# Patient Record
Sex: FEMALE | Race: WHITE | NOT HISPANIC OR LATINO | Employment: OTHER | URBAN - METROPOLITAN AREA
[De-identification: names, ages, dates, MRNs, and addresses within clinical notes are randomized per-mention and may not be internally consistent; named-entity substitution may affect disease eponyms.]

---

## 2021-05-05 ENCOUNTER — HISTORICAL (OUTPATIENT)
Dept: ADMINISTRATIVE | Facility: HOSPITAL | Age: 71
End: 2021-05-05

## 2021-06-23 ENCOUNTER — HISTORICAL (OUTPATIENT)
Dept: ADMINISTRATIVE | Facility: HOSPITAL | Age: 71
End: 2021-06-23

## 2022-04-30 NOTE — OP NOTE
Patient:   Yane Graves             MRN: [id number]            FIN: [id number]               Age:   70 years     Sex:  Female     :  1950   Associated Diagnoses:   None   Author:   Sterling Leo MD      Preoperative diagnosis: Cataract of the right  left eye     Postoperative diagnosis: Same     Operative procedure: Cataract extraction with intraocular lens implant    Lens Style: [id number]    The patient was brought to the operating theater by wheelchair and under light sedation given topical anesthesia using 0.75% Marcaine.  After adequate anesthesia the patient was then prepped and draped in usual ophthalmological manner.   Rachna lid speculums were applied and under the surgical microscope a keratome incision was made temporally using a lucia keratome blade and a 15° lucia keratome stab incision was made in the superior nasal quadrant.  Viscoat was instilled into the anterior chamber and an anterior capsulorrhexis was performed using a bent 25-gauge needle and the anterior capsule flap withdrawn with Kelman forceps. Using an irrigating Kelman cystotome the nucleus was irrigated and rocked free from the cortical bed and the handpiece was withdrawn. The phacoemulsification handpiece was introduced into the eye and phacoemulsification carried out the posterior chamber and the iris plane while a nucleus manipulator was used to direct the nucleus fragments  towards the phacoemulsification tip and prevent corneal contact. After phacoemulsification of the nucleus was completed the handpiece was withdrawn and an irrigation-aspiration handpiece was introduced into the eye and all visible cortical fragments were aspirated from the eye without difficulty. The handpiece was withdrawn and Healon was introduced into the eye to inflate the anterior chamber and the capsular bag.  An intraocular lens implant was selected, inspected, folded and placed into the lens injector and then the lens carefully  injected into the capsular bag while the haptics were positioned using the nucleus manipulator. The Healon was then aspirated from the eye using an irrigation-aspiration handpiece and the handpiece withdrawn from the eye. The anterior chamber was inflated with balanced salt solution and the wound checked for water tightness and found to be intact.  The antibiotic drops used preoperatively were instilled into the eye and the patient sent to the outpatient recovery in good condition.

## 2022-04-30 NOTE — OP NOTE
Patient:   Yane Graves             MRN: [id number]            FIN: [id number]               Age:   70 years     Sex:  Female     :  1950   Associated Diagnoses:   None   Author:   Sterling Leo MD      Preoperative diagnosis: Cataract of the  left eye     Postoperative diagnosis: Same     Operative procedure: Cataract extraction with intraocular lens implant    Lens Style: [id number]    The patient was brought to the operating theater by wheelchair and under light sedation given topical anesthesia using 0.75% Marcaine.  After adequate anesthesia the patient was then prepped and draped in usual ophthalmological manner.   Rachna lid speculums were applied and under the surgical microscope a keratome incision was made temporally using a lucia keratome blade and a 15° lucia keratome stab incision was made in the superior nasal quadrant.  Shugarcaine was used to improve pupillary dilation.  Viscoat was instilled into the anterior chamber and an anterior capsulorrhexis was performed using a bent 25-gauge needle and the anterior capsule flap withdrawn with Kelman forceps. Using an irrigating Kelman cystotome the nucleus was irrigated and rocked free from the cortical bed and the handpiece was withdrawn. The phacoemulsification handpiece was introduced into the eye and phacoemulsification carried out the posterior chamber and the iris plane while a nucleus manipulator was used to direct the nucleus fragments  towards the phacoemulsification tip and prevent corneal contact. After phacoemulsification of the nucleus was completed the handpiece was withdrawn and an irrigation-aspiration handpiece was introduced into the eye and all visible cortical fragments were aspirated from the eye without difficulty. The handpiece was withdrawn and Healon was introduced into the eye to inflate the anterior chamber and the capsular bag.  An intraocular lens implant was selected, inspected, folded and placed into  the lens injector and then the lens carefully injected into the capsular bag while the haptics were positioned using the nucleus manipulator. The Healon was then aspirated from the eye using an irrigation-aspiration handpiece and the handpiece withdrawn from the eye. The anterior chamber was inflated with balanced salt solution and the wound checked for water tightness and found to be intact.  The antibiotic drops used preoperatively were instilled into the eye and the patient sent to the outpatient recovery in good condition.

## 2022-04-30 NOTE — OP NOTE
Patient:   Yane Graves             MRN: [id number]            FIN: [id number]               Age:   70 years     Sex:  Female     :  1950   Associated Diagnoses:   None   Author:   Sterling Leo MD      Preoperative diagnosis: Cataract of the right  eye     Postoperative diagnosis: Same     Operative procedure: Cataract extraction with intraocular lens implant    Lens Style: [id number]    The patient was brought to the operating theater by wheelchair and under light sedation given topical anesthesia using 0.75% Marcaine.  After adequate anesthesia the patient was then prepped and draped in usual ophthalmological manner.   Rachna lid speculums were applied and under the surgical microscope a keratome incision was made temporally using a lucia keratome blade and a 15° lucia keratome stab incision was made in the superior nasal quadrant.  Shugarcaine was used to improve pupillary dilation.   Viscoat was instilled into the anterior chamber and an anterior capsulorrhexis was performed using a bent 25-gauge needle and the anterior capsule flap withdrawn with Kelman forceps. Using an irrigating Kelman cystotome the nucleus was irrigated and rocked free from the cortical bed and the handpiece was withdrawn. The phacoemulsification handpiece was introduced into the eye and phacoemulsification carried out the posterior chamber and the iris plane while a nucleus manipulator was used to direct the nucleus fragments  towards the phacoemulsification tip and prevent corneal contact. After phacoemulsification of the nucleus was completed the handpiece was withdrawn and an irrigation-aspiration handpiece was introduced into the eye and all visible cortical fragments were aspirated from the eye without difficulty. The handpiece was withdrawn and Healon was introduced into the eye to inflate the anterior chamber and the capsular bag.  An intraocular lens implant was selected, inspected, folded and placed  into the lens injector and then the lens carefully injected into the capsular bag while the haptics were positioned using the nucleus manipulator. The Healon was then aspirated from the eye using an irrigation-aspiration handpiece and the handpiece withdrawn from the eye. The anterior chamber was inflated with balanced salt solution and the wound checked for water tightness and found to be intact.  The antibiotic drops used preoperatively were instilled into the eye and the patient sent to the outpatient recovery in good condition.

## 2022-05-17 DIAGNOSIS — D15.1 ATRIAL MYXOMA: Primary | ICD-10-CM

## 2022-07-07 DIAGNOSIS — D15.1 BENIGN NEOPLASM OF HEART: Primary | ICD-10-CM

## 2022-07-12 ENCOUNTER — HOSPITAL ENCOUNTER (OUTPATIENT)
Dept: CARDIOLOGY | Facility: HOSPITAL | Age: 72
Discharge: HOME | End: 2022-07-12
Attending: INTERNAL MEDICINE
Payer: COMMERCIAL

## 2022-07-12 VITALS
BODY MASS INDEX: 42.49 KG/M2 | WEIGHT: 248.88 LBS | RESPIRATION RATE: 16 BRPM | OXYGEN SATURATION: 93 % | SYSTOLIC BLOOD PRESSURE: 122 MMHG | TEMPERATURE: 99 F | HEIGHT: 64 IN | DIASTOLIC BLOOD PRESSURE: 72 MMHG | HEART RATE: 99 BPM

## 2022-07-12 DIAGNOSIS — I48.91 A-FIB: ICD-10-CM

## 2022-07-12 DIAGNOSIS — D15.1 BENIGN NEOPLASM OF HEART: ICD-10-CM

## 2022-07-12 LAB
ANION GAP SERPL CALC-SCNC: 11 MEQ/L
BASOPHILS # BLD AUTO: 0.04 X10(3)/MCL (ref 0–0.2)
BASOPHILS NFR BLD AUTO: 0.5 %
BUN SERPL-MCNC: 17.5 MG/DL (ref 9.8–20.1)
CALCIUM SERPL-MCNC: 10 MG/DL (ref 8.4–10.2)
CHLORIDE SERPL-SCNC: 106 MMOL/L (ref 98–107)
CO2 SERPL-SCNC: 25 MMOL/L (ref 23–31)
CREAT SERPL-MCNC: 0.95 MG/DL (ref 0.55–1.02)
CREAT/UREA NIT SERPL: 18
EOSINOPHIL # BLD AUTO: 0.09 X10(3)/MCL (ref 0–0.9)
EOSINOPHIL NFR BLD AUTO: 1.2 %
ERYTHROCYTE [DISTWIDTH] IN BLOOD BY AUTOMATED COUNT: 13 % (ref 11.5–17)
GLUCOSE SERPL-MCNC: 130 MG/DL (ref 82–115)
HCT VFR BLD AUTO: 44 % (ref 37–47)
HGB BLD-MCNC: 14 GM/DL (ref 12–16)
IMM GRANULOCYTES # BLD AUTO: 0.02 X10(3)/MCL (ref 0–0.04)
IMM GRANULOCYTES NFR BLD AUTO: 0.3 %
INR BLD: 0.96 (ref 0–1.3)
LYMPHOCYTES # BLD AUTO: 2.92 X10(3)/MCL (ref 0.6–4.6)
LYMPHOCYTES NFR BLD AUTO: 39 %
MCH RBC QN AUTO: 27.1 PG (ref 27–31)
MCHC RBC AUTO-ENTMCNC: 31.8 MG/DL (ref 33–36)
MCV RBC AUTO: 85.1 FL (ref 80–94)
MONOCYTES # BLD AUTO: 0.58 X10(3)/MCL (ref 0.1–1.3)
MONOCYTES NFR BLD AUTO: 7.7 %
NEUTROPHILS # BLD AUTO: 3.8 X10(3)/MCL (ref 2.1–9.2)
NEUTROPHILS NFR BLD AUTO: 51.3 %
NRBC BLD AUTO-RTO: 0 %
PLATELET # BLD AUTO: 229 X10(3)/MCL (ref 130–400)
PMV BLD AUTO: 9 FL (ref 7.4–10.4)
POTASSIUM SERPL-SCNC: 3.7 MMOL/L (ref 3.5–5.1)
PROTHROMBIN TIME: 12.7 SECONDS (ref 12.5–14.5)
RBC # BLD AUTO: 5.17 X10(6)/MCL (ref 4.2–5.4)
SODIUM SERPL-SCNC: 142 MMOL/L (ref 136–145)
WBC # SPEC AUTO: 7.5 X10(3)/MCL (ref 4.5–11.5)

## 2022-07-12 PROCEDURE — 93010 EKG 12-LEAD: ICD-10-PCS | Mod: ,,, | Performed by: INTERNAL MEDICINE

## 2022-07-12 PROCEDURE — 93325 DOPPLER ECHO COLOR FLOW MAPG: CPT

## 2022-07-12 PROCEDURE — 93010 ELECTROCARDIOGRAM REPORT: CPT | Mod: ,,, | Performed by: INTERNAL MEDICINE

## 2022-07-12 PROCEDURE — 25000242 PHARM REV CODE 250 ALT 637 W/ HCPCS: Performed by: ANESTHESIOLOGY

## 2022-07-12 PROCEDURE — 80048 BASIC METABOLIC PNL TOTAL CA: CPT | Performed by: ANESTHESIOLOGY

## 2022-07-12 PROCEDURE — 85025 COMPLETE CBC W/AUTO DIFF WBC: CPT | Performed by: ANESTHESIOLOGY

## 2022-07-12 PROCEDURE — 85610 PROTHROMBIN TIME: CPT | Performed by: ANESTHESIOLOGY

## 2022-07-12 PROCEDURE — 36415 COLL VENOUS BLD VENIPUNCTURE: CPT | Performed by: ANESTHESIOLOGY

## 2022-07-12 PROCEDURE — 93005 ELECTROCARDIOGRAM TRACING: CPT | Mod: 59

## 2022-07-12 PROCEDURE — 63600175 PHARM REV CODE 636 W HCPCS: Performed by: INTERNAL MEDICINE

## 2022-07-12 RX ORDER — VALACYCLOVIR HYDROCHLORIDE 500 MG/1
500 TABLET, FILM COATED ORAL DAILY
COMMUNITY
Start: 2022-06-27

## 2022-07-12 RX ORDER — HYDROGEN PEROXIDE 3 %
40 SOLUTION, NON-ORAL MISCELLANEOUS DAILY
COMMUNITY

## 2022-07-12 RX ORDER — HYDROCHLOROTHIAZIDE 25 MG/1
25 TABLET ORAL DAILY
COMMUNITY
Start: 2022-06-24

## 2022-07-12 RX ORDER — HYDROXYZINE PAMOATE 25 MG/1
1 CAPSULE ORAL
COMMUNITY
Start: 2021-04-28

## 2022-07-12 RX ORDER — METOPROLOL SUCCINATE 25 MG/1
25 TABLET, EXTENDED RELEASE ORAL DAILY
COMMUNITY
Start: 2022-06-24

## 2022-07-12 RX ORDER — SIMVASTATIN 40 MG/1
1 TABLET, FILM COATED ORAL NIGHTLY
COMMUNITY
Start: 2022-06-20

## 2022-07-12 RX ORDER — IPRATROPIUM BROMIDE 0.5 MG/2.5ML
0.5 SOLUTION RESPIRATORY (INHALATION) ONCE
Status: COMPLETED | OUTPATIENT
Start: 2022-07-12 | End: 2022-07-12

## 2022-07-12 RX ORDER — MIDAZOLAM HYDROCHLORIDE 1 MG/ML
INJECTION INTRAMUSCULAR; INTRAVENOUS
Status: COMPLETED | OUTPATIENT
Start: 2022-07-12 | End: 2022-07-12

## 2022-07-12 RX ORDER — SODIUM CHLORIDE 0.9 % (FLUSH) 0.9 %
10 SYRINGE (ML) INJECTION
Status: DISCONTINUED | OUTPATIENT
Start: 2022-07-12 | End: 2022-07-13 | Stop reason: HOSPADM

## 2022-07-12 RX ORDER — FENTANYL CITRATE 50 UG/ML
INJECTION, SOLUTION INTRAMUSCULAR; INTRAVENOUS
Status: COMPLETED | OUTPATIENT
Start: 2022-07-12 | End: 2022-07-12

## 2022-07-12 RX ORDER — DIPHENHYDRAMINE HYDROCHLORIDE 50 MG/ML
INJECTION INTRAMUSCULAR; INTRAVENOUS
Status: COMPLETED | OUTPATIENT
Start: 2022-07-12 | End: 2022-07-12

## 2022-07-12 RX ORDER — BUDESONIDE AND FORMOTEROL FUMARATE DIHYDRATE 160; 4.5 UG/1; UG/1
2 AEROSOL RESPIRATORY (INHALATION) 2 TIMES DAILY
COMMUNITY
Start: 2022-06-21

## 2022-07-12 RX ORDER — MELOXICAM 15 MG/1
1 TABLET ORAL DAILY
COMMUNITY
Start: 2022-07-09

## 2022-07-12 RX ORDER — BUPROPION HYDROCHLORIDE 300 MG/1
1 TABLET ORAL DAILY
COMMUNITY
Start: 2021-04-28

## 2022-07-12 RX ORDER — LIDOCAINE HYDROCHLORIDE 10 MG/ML
1 INJECTION, SOLUTION EPIDURAL; INFILTRATION; INTRACAUDAL; PERINEURAL ONCE
Status: CANCELLED | OUTPATIENT
Start: 2022-07-12 | End: 2022-07-12

## 2022-07-12 RX ORDER — GABAPENTIN 100 MG/1
100 CAPSULE ORAL EVERY 8 HOURS
COMMUNITY
Start: 2022-06-27

## 2022-07-12 RX ORDER — LEVALBUTEROL TARTRATE 45 UG/1
2 AEROSOL, METERED ORAL
COMMUNITY
Start: 2022-06-21

## 2022-07-12 RX ORDER — GABAPENTIN 300 MG/1
1 CAPSULE ORAL NIGHTLY
COMMUNITY
Start: 2022-06-27

## 2022-07-12 RX ORDER — LEVOTHYROXINE SODIUM 112 UG/1
112 TABLET ORAL EVERY MORNING
COMMUNITY
Start: 2022-06-03

## 2022-07-12 RX ADMIN — IPRATROPIUM BROMIDE 0.5 MG: 0.5 SOLUTION RESPIRATORY (INHALATION) at 08:07

## 2022-07-12 RX ADMIN — MIDAZOLAM 1 MG: 1 INJECTION INTRAMUSCULAR; INTRAVENOUS at 08:07

## 2022-07-12 RX ADMIN — FENTANYL CITRATE 25 MCG: 50 INJECTION, SOLUTION INTRAMUSCULAR; INTRAVENOUS at 09:07

## 2022-07-12 RX ADMIN — DIPHENHYDRAMINE HYDROCHLORIDE 50 MG: 50 INJECTION INTRAMUSCULAR; INTRAVENOUS at 08:07

## 2022-07-12 RX ADMIN — FENTANYL CITRATE 50 MCG: 50 INJECTION, SOLUTION INTRAMUSCULAR; INTRAVENOUS at 09:07

## 2022-07-12 RX ADMIN — MIDAZOLAM 1 MG: 1 INJECTION INTRAMUSCULAR; INTRAVENOUS at 09:07

## 2022-07-13 NOTE — OP NOTE
PROCEDURE:    Transesophageal echocardiogram with 3-dimensional imaging.    INDICATION:   Myxoma     METHODS:    After informed consent was obtained, the patient was brought to the PACU in a fasting state.  Anesthesia was provided by the anesthesia service.  The probe was introduced to the esophagus on the first attempt without difficulty.  Images were then obtained.  The probe was then withdrawn.  There was no blood noted on the tip of the probe after it was withdrawn, and there were no immediate complications.    FINDINGS:    Left Atrium:  Left atrium large size myxoma  and the left atrial appendage were well visualized and were free of any clot or spontaneous echo contrast.    Interatrial septum:  The interatrial septum was visualized to be intact when interrogated by color-flow Doppler.  A bubble study was performed and is negative for any intracardiac right-to-left shunt.    Tricuspid valve:  There is no significant stenosis and physiologic regurgitation.    Mitral valve:  There is no significant stenosis and trace regurgitation.    Aortic valve:  The aortic valve is visualized to be trileaflet and there is no evidence of any significant stenosis or regurgitation.    Left ventricle:  Grossly appears to have normal size and contractility.    Pulmonic valve:  The pulmonic valve is not well visualized.    Aorta:  The aorta contains mild plaquing.    IMPRESSION:  1.  No clot seen in either the left atrium or the left atrial appendage.  2. Large Left atrium myxoma .    RECOMMENDATIONS:    Will discuss options for long term rhythm monitoring with the patient

## 2022-08-24 NOTE — DISCHARGE SUMMARY
Pt tolerate the procedure  No complication  Follow up in clinic   5 minutes to discharge summary    Activity as tolerate  Dx htn, obesity abnormal stress

## 2023-04-28 ENCOUNTER — LAB REQUISITION (OUTPATIENT)
Dept: LAB | Facility: HOSPITAL | Age: 73
End: 2023-04-28
Payer: COMMERCIAL

## 2023-04-28 DIAGNOSIS — N39.0 URINARY TRACT INFECTION, SITE NOT SPECIFIED: ICD-10-CM

## 2023-04-28 LAB
APPEARANCE UR: CLEAR
BACTERIA #/AREA URNS AUTO: NORMAL /HPF
BILIRUB UR QL STRIP.AUTO: NEGATIVE MG/DL
COLOR UR AUTO: YELLOW
GLUCOSE UR QL STRIP.AUTO: NEGATIVE MG/DL
KETONES UR QL STRIP.AUTO: NEGATIVE MG/DL
LEUKOCYTE ESTERASE UR QL STRIP.AUTO: NEGATIVE UNIT/L
NITRITE UR QL STRIP.AUTO: NEGATIVE
PH UR STRIP.AUTO: 6.5 [PH]
PROT UR QL STRIP.AUTO: NEGATIVE MG/DL
RBC #/AREA URNS AUTO: <5 /HPF
RBC UR QL AUTO: NEGATIVE UNIT/L
SP GR UR STRIP.AUTO: 1.01 (ref 1–1.03)
SQUAMOUS #/AREA URNS AUTO: <5 /HPF
UROBILINOGEN UR STRIP-ACNC: 0.2 MG/DL
WBC #/AREA URNS AUTO: <5 /HPF

## 2023-04-28 PROCEDURE — 81001 URINALYSIS AUTO W/SCOPE: CPT | Performed by: FAMILY MEDICINE

## 2023-12-12 ENCOUNTER — LAB REQUISITION (OUTPATIENT)
Dept: LAB | Facility: HOSPITAL | Age: 73
End: 2023-12-12
Payer: COMMERCIAL

## 2023-12-12 DIAGNOSIS — Z00.00 ENCOUNTER FOR GENERAL ADULT MEDICAL EXAMINATION WITHOUT ABNORMAL FINDINGS: ICD-10-CM

## 2023-12-12 LAB
ALBUMIN SERPL-MCNC: 3.7 G/DL (ref 3.4–4.8)
ALBUMIN/GLOB SERPL: 1.2 RATIO (ref 1.1–2)
ALP SERPL-CCNC: 88 UNIT/L (ref 40–150)
ALT SERPL-CCNC: 11 UNIT/L (ref 0–55)
APPEARANCE UR: CLEAR
AST SERPL-CCNC: 11 UNIT/L (ref 5–34)
BACTERIA #/AREA URNS AUTO: ABNORMAL /HPF
BASOPHILS # BLD AUTO: 0.03 X10(3)/MCL
BASOPHILS NFR BLD AUTO: 0.5 %
BILIRUB SERPL-MCNC: 0.7 MG/DL
BILIRUB UR QL STRIP.AUTO: NEGATIVE
BUN SERPL-MCNC: 16 MG/DL (ref 9.8–20.1)
CALCIUM SERPL-MCNC: 9.5 MG/DL (ref 8.4–10.2)
CHLORIDE SERPL-SCNC: 110 MMOL/L (ref 98–107)
CHOLEST SERPL-MCNC: 178 MG/DL
CHOLEST/HDLC SERPL: 3 {RATIO} (ref 0–5)
CO2 SERPL-SCNC: 27 MMOL/L (ref 23–31)
COLOR UR AUTO: ABNORMAL
CREAT SERPL-MCNC: 0.8 MG/DL (ref 0.55–1.02)
EOSINOPHIL # BLD AUTO: 0.15 X10(3)/MCL (ref 0–0.9)
EOSINOPHIL NFR BLD AUTO: 2.7 %
ERYTHROCYTE [DISTWIDTH] IN BLOOD BY AUTOMATED COUNT: 14 % (ref 11.5–17)
GFR SERPLBLD CREATININE-BSD FMLA CKD-EPI: >60 MLS/MIN/1.73/M2
GLOBULIN SER-MCNC: 3.1 GM/DL (ref 2.4–3.5)
GLUCOSE SERPL-MCNC: 110 MG/DL (ref 82–115)
GLUCOSE UR QL STRIP.AUTO: NORMAL
HCT VFR BLD AUTO: 40.2 % (ref 37–47)
HDLC SERPL-MCNC: 53 MG/DL (ref 35–60)
HGB BLD-MCNC: 12.9 G/DL (ref 12–16)
IMM GRANULOCYTES # BLD AUTO: 0.01 X10(3)/MCL (ref 0–0.04)
IMM GRANULOCYTES NFR BLD AUTO: 0.2 %
KETONES UR QL STRIP.AUTO: NEGATIVE
LDLC SERPL CALC-MCNC: 101 MG/DL (ref 50–140)
LEUKOCYTE ESTERASE UR QL STRIP.AUTO: NEGATIVE
LYMPHOCYTES # BLD AUTO: 2.16 X10(3)/MCL (ref 0.6–4.6)
LYMPHOCYTES NFR BLD AUTO: 38.8 %
MCH RBC QN AUTO: 26.9 PG (ref 27–31)
MCHC RBC AUTO-ENTMCNC: 32.1 G/DL (ref 33–36)
MCV RBC AUTO: 83.9 FL (ref 80–94)
MONOCYTES # BLD AUTO: 0.34 X10(3)/MCL (ref 0.1–1.3)
MONOCYTES NFR BLD AUTO: 6.1 %
MUCOUS THREADS URNS QL MICRO: ABNORMAL /LPF
NEUTROPHILS # BLD AUTO: 2.88 X10(3)/MCL (ref 2.1–9.2)
NEUTROPHILS NFR BLD AUTO: 51.7 %
NITRITE UR QL STRIP.AUTO: NEGATIVE
NRBC BLD AUTO-RTO: 0 %
PH UR STRIP.AUTO: 5 [PH]
PLATELET # BLD AUTO: 248 X10(3)/MCL (ref 130–400)
PMV BLD AUTO: 8.8 FL (ref 7.4–10.4)
POTASSIUM SERPL-SCNC: 4 MMOL/L (ref 3.5–5.1)
PROT SERPL-MCNC: 6.8 GM/DL (ref 5.8–7.6)
PROT UR QL STRIP.AUTO: NEGATIVE
RBC # BLD AUTO: 4.79 X10(6)/MCL (ref 4.2–5.4)
RBC #/AREA URNS AUTO: ABNORMAL /HPF
RBC UR QL AUTO: NEGATIVE
SODIUM SERPL-SCNC: 144 MMOL/L (ref 136–145)
SP GR UR STRIP.AUTO: 1.02 (ref 1–1.03)
SQUAMOUS #/AREA URNS LPF: ABNORMAL /HPF
T4 SERPL-MCNC: 12.12 UG/DL (ref 4.87–11.72)
TRIGL SERPL-MCNC: 118 MG/DL (ref 37–140)
TSH SERPL-ACNC: 1.28 UIU/ML (ref 0.35–4.94)
UROBILINOGEN UR STRIP-ACNC: NORMAL
VLDLC SERPL CALC-MCNC: 24 MG/DL
WBC # SPEC AUTO: 5.57 X10(3)/MCL (ref 4.5–11.5)
WBC #/AREA URNS AUTO: ABNORMAL /HPF

## 2023-12-12 PROCEDURE — 87086 URINE CULTURE/COLONY COUNT: CPT | Performed by: FAMILY MEDICINE

## 2023-12-12 PROCEDURE — 85025 COMPLETE CBC W/AUTO DIFF WBC: CPT | Performed by: FAMILY MEDICINE

## 2023-12-12 PROCEDURE — 80053 COMPREHEN METABOLIC PANEL: CPT | Performed by: FAMILY MEDICINE

## 2023-12-12 PROCEDURE — 84443 ASSAY THYROID STIM HORMONE: CPT | Performed by: FAMILY MEDICINE

## 2023-12-12 PROCEDURE — 80061 LIPID PANEL: CPT | Performed by: FAMILY MEDICINE

## 2023-12-12 PROCEDURE — 84436 ASSAY OF TOTAL THYROXINE: CPT | Performed by: FAMILY MEDICINE

## 2023-12-12 PROCEDURE — 81001 URINALYSIS AUTO W/SCOPE: CPT | Performed by: FAMILY MEDICINE

## 2023-12-14 LAB — BACTERIA UR CULT: NORMAL
